# Patient Record
Sex: FEMALE | Race: WHITE | NOT HISPANIC OR LATINO | Employment: OTHER | URBAN - METROPOLITAN AREA
[De-identification: names, ages, dates, MRNs, and addresses within clinical notes are randomized per-mention and may not be internally consistent; named-entity substitution may affect disease eponyms.]

---

## 2020-12-09 ENCOUNTER — OFFICE VISIT (OUTPATIENT)
Dept: OBSTETRICS AND GYNECOLOGY | Facility: CLINIC | Age: 70
End: 2020-12-09

## 2020-12-09 VITALS
BODY MASS INDEX: 22.5 KG/M2 | WEIGHT: 140 LBS | HEIGHT: 66 IN | DIASTOLIC BLOOD PRESSURE: 70 MMHG | SYSTOLIC BLOOD PRESSURE: 122 MMHG

## 2020-12-09 DIAGNOSIS — Z01.419 ENCOUNTER FOR GYNECOLOGICAL EXAMINATION WITHOUT ABNORMAL FINDING: Primary | ICD-10-CM

## 2020-12-09 DIAGNOSIS — N39.42 INCONTINENCE WITHOUT SENSORY AWARENESS: ICD-10-CM

## 2020-12-09 DIAGNOSIS — Z12.39 ENCOUNTER FOR BREAST CANCER SCREENING USING NON-MAMMOGRAM MODALITY: ICD-10-CM

## 2020-12-09 DIAGNOSIS — N39.3 STRESS INCONTINENCE IN FEMALE: ICD-10-CM

## 2020-12-09 LAB
DEVELOPER EXPIRATION DATE: NORMAL
DEVELOPER LOT NUMBER: NORMAL
EXPIRATION DATE: NORMAL
FECAL OCCULT BLOOD SCREEN, POC: NEGATIVE
Lab: NORMAL
NEGATIVE CONTROL: NEGATIVE
POSITIVE CONTROL: POSITIVE

## 2020-12-09 PROCEDURE — G0101 CA SCREEN;PELVIC/BREAST EXAM: HCPCS | Performed by: OBSTETRICS & GYNECOLOGY

## 2020-12-09 PROCEDURE — 82274 ASSAY TEST FOR BLOOD FECAL: CPT | Performed by: OBSTETRICS & GYNECOLOGY

## 2020-12-09 RX ORDER — LEVOTHYROXINE SODIUM 0.1 MG/1
TABLET ORAL
COMMUNITY
End: 2022-07-27

## 2020-12-09 RX ORDER — LISINOPRIL 10 MG/1
TABLET ORAL
COMMUNITY

## 2020-12-09 RX ORDER — LEVOTHYROXINE SODIUM 112 UG/1
TABLET ORAL
COMMUNITY
Start: 2020-12-03

## 2020-12-09 RX ORDER — ATENOLOL 50 MG/1
TABLET ORAL
COMMUNITY

## 2020-12-09 RX ORDER — GEMFIBROZIL 600 MG/1
TABLET, FILM COATED ORAL
COMMUNITY
End: 2023-01-05

## 2020-12-09 NOTE — PROGRESS NOTES
Subjective    Chief Complaint   Patient presents with   • Gynecologic Exam     AE      History of Present Illness    Pat Willoughby is a 70 y.o. female who presents for annual exam.  Non-smoker.  Mammogram in August negative.  Getting a copy.  Previous normal DEXA scan.  Colonoscopy 2017 and due in 2 more years due to polyp history.  No bleeding and no problems.    Obstetric History:  OB History    No obstetric history on file.        Menstrual History:     No LMP recorded.       Past Medical History:   Diagnosis Date   • Disease of thyroid gland    • Heartburn    • Hyperlipidemia    • Hypertension      Family History   Problem Relation Age of Onset   • Lung cancer Father    • Heart disease Mother    • Cancer Brother    • Breast cancer Sister      Social History     Tobacco Use   Smoking Status Never Smoker         The following portions of the patient's history were reviewed and updated as appropriate: allergies, current medications, past family history, past medical history, past social history, past surgical history and problem list.    Review of Systems   Constitutional: Negative.  Negative for fever and unexpected weight change.   HENT: Negative.    Respiratory: Negative for shortness of breath and wheezing.    Cardiovascular: Negative for chest pain, palpitations and leg swelling.   Gastrointestinal: Negative for abdominal pain, anal bleeding and blood in stool.   Genitourinary: Negative for dysuria, pelvic pain, urgency, vaginal bleeding, vaginal discharge and vaginal pain.   Skin: Negative.    Neurological: Negative.    Hematological: Negative.  Negative for adenopathy.   Psychiatric/Behavioral: Negative.  Negative for dysphoric mood. The patient is not nervous/anxious.             Objective   Physical Exam  Exam conducted with a chaperone present.   Constitutional:       Appearance: Normal appearance. She is well-developed.   HENT:      Head: Normocephalic.   Neck:      Thyroid: No thyromegaly.      Trachea:  "Trachea normal. No tracheal deviation.   Cardiovascular:      Rate and Rhythm: Normal rate and regular rhythm.      Heart sounds: Normal heart sounds. No murmur.   Pulmonary:      Effort: Pulmonary effort is normal.      Breath sounds: Normal breath sounds.   Chest:      Breasts:         Right: Normal. No mass, nipple discharge or tenderness.         Left: Normal. No mass, nipple discharge or tenderness.   Abdominal:      Palpations: Abdomen is soft. There is no mass.      Tenderness: There is no abdominal tenderness.      Hernia: No hernia is present.   Genitourinary:     General: Normal vulva.      Labia:         Right: No tenderness or lesion.         Left: No tenderness or lesion.       Urethra: No prolapse or urethral lesion.      Vagina: Normal. No vaginal discharge or lesions.      Cervix: No cervical motion tenderness.      Uterus: Not enlarged and not tender.       Adnexa:         Right: No mass or tenderness.          Left: No mass or tenderness.        Rectum: Normal. Guaiac result negative. No external hemorrhoid or internal hemorrhoid. Normal anal tone.      Comments: External genitalia normal   Lymphadenopathy:      Cervical: No cervical adenopathy.      Upper Body:      Right upper body: No axillary adenopathy.      Left upper body: No axillary adenopathy.   Skin:     General: Skin is warm and dry.      Findings: No rash.   Neurological:      Mental Status: She is alert and oriented to person, place, and time.   Psychiatric:         Behavior: Behavior normal.         /70   Ht 167.6 cm (66\")   Wt 63.5 kg (140 lb)   BMI 22.60 kg/m²     Assessment/Plan   Diagnoses and all orders for this visit:    1. Encounter for gynecological examination without abnormal finding (Primary)  -     IGP,rfx Aptima HPV All Pth  -     POC Occult Blood Stool    2. Encounter for breast cancer screening using non-mammogram modality        Return to office 1 year.  Counseled about taking calcium and vitamin D.         "

## 2020-12-11 ENCOUNTER — TELEPHONE (OUTPATIENT)
Dept: OBSTETRICS AND GYNECOLOGY | Facility: CLINIC | Age: 70
End: 2020-12-11

## 2020-12-11 LAB
APPEARANCE UR: CLEAR
BACTERIA #/AREA URNS HPF: NORMAL /[HPF]
BACTERIA UR CULT: NO GROWTH
BACTERIA UR CULT: NORMAL
BILIRUB UR QL STRIP: NEGATIVE
COLOR UR: YELLOW
EPI CELLS #/AREA URNS HPF: NORMAL /HPF (ref 0–10)
GLUCOSE UR QL: NEGATIVE
HGB UR QL STRIP: NEGATIVE
KETONES UR QL STRIP: NEGATIVE
LEUKOCYTE ESTERASE UR QL STRIP: ABNORMAL
MICRO URNS: ABNORMAL
NITRITE UR QL STRIP: NEGATIVE
PH UR STRIP: 6.5 [PH] (ref 5–7.5)
PROT UR QL STRIP: NEGATIVE
RBC #/AREA URNS HPF: NORMAL /HPF (ref 0–2)
SP GR UR: 1.01 (ref 1–1.03)
UROBILINOGEN UR STRIP-MCNC: 0.2 MG/DL (ref 0.2–1)
WBC #/AREA URNS HPF: NORMAL /HPF (ref 0–5)

## 2020-12-11 NOTE — TELEPHONE ENCOUNTER
----- Message from Zander Das MD sent at 12/11/2020 12:01 PM EST -----  Please tell patient that her urinalysis and urine culture were negative for UTI.  JOSE

## 2020-12-14 LAB
CONV .: NORMAL
CYTOLOGIST CVX/VAG CYTO: NORMAL
CYTOLOGY CVX/VAG DOC CYTO: NORMAL
CYTOLOGY CVX/VAG DOC THIN PREP: NORMAL
DX ICD CODE: NORMAL
HIV 1 & 2 AB SER-IMP: NORMAL
OTHER STN SPEC: NORMAL
PATHOLOGIST CVX/VAG CYTO: NORMAL
STAT OF ADQ CVX/VAG CYTO-IMP: NORMAL

## 2021-01-04 ENCOUNTER — TELEPHONE (OUTPATIENT)
Dept: OBSTETRICS AND GYNECOLOGY | Facility: CLINIC | Age: 71
End: 2021-01-04

## 2021-01-04 NOTE — TELEPHONE ENCOUNTER
You can tell patient that her Pap is totally negative.  It does list some reactive changes in the cells but that is totally normal and nothing to need repeat or be worried about precancer.  Thankyou JOSE

## 2021-03-24 ENCOUNTER — OFFICE VISIT (OUTPATIENT)
Dept: OBSTETRICS AND GYNECOLOGY | Facility: CLINIC | Age: 71
End: 2021-03-24

## 2021-03-24 VITALS
DIASTOLIC BLOOD PRESSURE: 76 MMHG | SYSTOLIC BLOOD PRESSURE: 126 MMHG | HEIGHT: 66 IN | BODY MASS INDEX: 22.5 KG/M2 | WEIGHT: 140 LBS

## 2021-03-24 DIAGNOSIS — N81.4 UTERINE PROLAPSE: Primary | ICD-10-CM

## 2021-03-24 PROCEDURE — 99213 OFFICE O/P EST LOW 20 MIN: CPT | Performed by: OBSTETRICS & GYNECOLOGY

## 2021-03-24 NOTE — PROGRESS NOTES
"Subjective    Chief Complaint   Patient presents with   • Follow-up     pt states vaginal bump      History of Present Illness    Pat Willoughby is a 71 y.o. female who presents for slightly worsening uterine prolapse which patient has recently noticed.  She has absolutely no bleeding or other symptoms such as pelvic pain or pressure.  She does note that it is worse when she is on her feet.  She is not sexually active.  Pap smear performed in December was negative and exam at that time did not demonstrate it.  Of course it may have not been showing since she was lying down.    Obstetric History:  OB History    No obstetric history on file.        Menstrual History:     No LMP recorded.       Past Medical History:   Diagnosis Date   • Disease of thyroid gland    • Heartburn    • Hyperlipidemia    • Hypertension      Family History   Problem Relation Age of Onset   • Lung cancer Father    • Heart disease Mother    • Cancer Brother    • Breast cancer Sister        The following portions of the patient's history were reviewed and updated as appropriate: allergies, current medications, past family history, past medical history, past social history, past surgical history and problem list.    Review of Systems  Negative except for seeing and feeling cervix at the introitus       Objective   Physical Exam  Second to third-degree uterine descensus with cervix at the introitus.  No large cystocele.  Bimanual exam negative.  /76   Ht 167.6 cm (66\")   Wt 63.5 kg (140 lb)   BMI 22.60 kg/m²     Assessment/Plan   Diagnoses and all orders for this visit:    1. Uterine prolapse (Primary)        Impression and plan.  Discussed uterine descensus with patient.  Her cervix does come to the introitus and we did discuss pessary and possible hysterectomy if it worsens but she is having no symptoms now and would like me to recheck it in 4 months.  I reassured her there was nothing else going on at this time.  She does get some urinary " frequency but states she does drink beer and that is probably the reason.

## 2021-07-28 ENCOUNTER — OFFICE VISIT (OUTPATIENT)
Dept: OBSTETRICS AND GYNECOLOGY | Facility: CLINIC | Age: 71
End: 2021-07-28

## 2021-07-28 VITALS
BODY MASS INDEX: 22.5 KG/M2 | DIASTOLIC BLOOD PRESSURE: 80 MMHG | HEIGHT: 66 IN | WEIGHT: 140 LBS | SYSTOLIC BLOOD PRESSURE: 122 MMHG

## 2021-07-28 DIAGNOSIS — N81.4 UTERINE PROLAPSE: Primary | ICD-10-CM

## 2021-07-28 PROCEDURE — 99213 OFFICE O/P EST LOW 20 MIN: CPT | Performed by: OBSTETRICS & GYNECOLOGY

## 2021-07-28 NOTE — PROGRESS NOTES
"Subjective    Chief Complaint   Patient presents with   • Follow-up     f/u uterine prolapse      History of Present Illness    Pat Willoughby is a 71 y.o. female who presents for follow-up of uterine descensus.  Patient has almost no symptoms although she can occasionally see the cervix at the introitus.  She does not feel like it has gotten worse.  She is not sexually active.  She is not having any pelvic pressure or pain.    Obstetric History:  OB History    No obstetric history on file.        Menstrual History:     No LMP recorded.       Past Medical History:   Diagnosis Date   • Disease of thyroid gland    • Heartburn    • Hyperlipidemia    • Hypertension      Family History   Problem Relation Age of Onset   • Lung cancer Father    • Heart disease Mother    • Cancer Brother    • Breast cancer Sister        The following portions of the patient's history were reviewed and updated as appropriate: allergies, current medications, past family history, past medical history, past social history, past surgical history and problem list.    Review of Systems  Negative       Objective   Physical Exam  Uterine descensus today second-degree.  Bimanual exam negative.  Cervix without lesion.  /80   Ht 167.6 cm (66\")   Wt 63.5 kg (140 lb)   BMI 22.60 kg/m²     Assessment/Plan   Diagnoses and all orders for this visit:    1. Uterine prolapse (Primary)        Impression and plan.   Discussion today concerning uterine prolapse which has not appeared to have changed since 4 months ago.  Due to her lack of symptoms at this time and her desire not to have surgery unless needed I feel like we should just continue to watch it and have her return with her next annual exam.  We discussed doing surgery if she started to become symptomatic and she would be relatively low risk for this as she has had no major medical problems or pelvic surgery other than tubal ligation.  Patient knows she can return at any time if she becomes more " symptomatic.  She is not interested in a pessary at this time and I do not really feel like she needs 1..

## 2022-07-27 ENCOUNTER — OFFICE VISIT (OUTPATIENT)
Dept: OBSTETRICS AND GYNECOLOGY | Facility: CLINIC | Age: 72
End: 2022-07-27

## 2022-07-27 VITALS
WEIGHT: 143 LBS | DIASTOLIC BLOOD PRESSURE: 80 MMHG | BODY MASS INDEX: 22.98 KG/M2 | HEIGHT: 66 IN | SYSTOLIC BLOOD PRESSURE: 140 MMHG

## 2022-07-27 DIAGNOSIS — N81.4 UTERINE PROLAPSE: Primary | ICD-10-CM

## 2022-07-27 PROCEDURE — 99213 OFFICE O/P EST LOW 20 MIN: CPT | Performed by: OBSTETRICS & GYNECOLOGY

## 2022-07-27 RX ORDER — PANTOPRAZOLE SODIUM 40 MG/1
TABLET, DELAYED RELEASE ORAL
COMMUNITY
Start: 2022-06-10

## 2022-07-27 NOTE — PROGRESS NOTES
"Subjective    Chief Complaint   Patient presents with   • Follow-up     Uterine prolapse, pt states feels a little worse      History of Present Illness    Pat Willoughby is a 72 y.o. female who presents for follow-up of second-degree uterine descensus which patient states may be a little worse, especially with standing.  She states she has no stress incontinence issues.  She really is having no significant pelvic pain.  She is just concerned because it may have progressed a little bit.    Obstetric History:  OB History    No obstetric history on file.        Menstrual History:     No LMP recorded.       Past Medical History:   Diagnosis Date   • Disease of thyroid gland    • Heartburn    • Hyperlipidemia    • Hypertension      Family History   Problem Relation Age of Onset   • Lung cancer Father    • Heart disease Mother    • Cancer Brother    • Breast cancer Sister        The following portions of the patient's history were reviewed and updated as appropriate: allergies, current medications, past family history, past medical history, past social history, past surgical history and problem list.    Review of Systems  As per HPI       Objective   Physical Exam  Vagina clear.  No significant cystocele.  Second to third-degree uterine descensus with cervix at introitus.  Bimanual and rectovaginal exam negative.  No significant rectocele.  /80   Ht 167.6 cm (66\")   Wt 64.9 kg (143 lb)   BMI 23.08 kg/m²     Assessment & Plan   Diagnoses and all orders for this visit:    1. Uterine prolapse (Primary)  -     US Non-ob Transvaginal; Future        Impression and plan.  Discussed options concerning uterine descensus without incontinence including total vaginal hysterectomy, pessary placement, or continued observation.  I explained that I usually do like to check the ovaries with an ultrasound prior to a hysterectomy as any abnormality there could influence what surgery we do.  Patient would like to have an ultrasound " although she has not made any decision about surgery.  She will come back in the next couple weeks for an ultrasound and to follow-up with me.  She is not due for Pap smear until December.

## 2022-08-11 ENCOUNTER — OFFICE VISIT (OUTPATIENT)
Dept: OBSTETRICS AND GYNECOLOGY | Facility: CLINIC | Age: 72
End: 2022-08-11

## 2022-08-11 VITALS
DIASTOLIC BLOOD PRESSURE: 84 MMHG | BODY MASS INDEX: 22.98 KG/M2 | HEIGHT: 66 IN | WEIGHT: 143 LBS | SYSTOLIC BLOOD PRESSURE: 142 MMHG

## 2022-08-11 DIAGNOSIS — N83.201 CYST OF RIGHT OVARY: ICD-10-CM

## 2022-08-11 DIAGNOSIS — N81.4 UTERINE PROLAPSE: Primary | ICD-10-CM

## 2022-08-11 PROCEDURE — 99213 OFFICE O/P EST LOW 20 MIN: CPT | Performed by: OBSTETRICS & GYNECOLOGY

## 2022-08-11 NOTE — PROGRESS NOTES
"Subjective    Chief Complaint   Patient presents with   • Follow-up     Discuss u/s      History of Present Illness    Pat Willoughby is a 72 y.o. female who presents for problems with uterine prolapse without incontinence, here today for pelvic ultrasound.  Ultrasound today showed normal 2 mm endometrial stripe with small fibroid and 1.3 cm complex right ovarian cyst.  Patient not having any symptoms from her prolapse and really not wanting surgery at this time.  No vaginal bleeding    Obstetric History:  OB History    No obstetric history on file.        Menstrual History:     No LMP recorded.       Past Medical History:   Diagnosis Date   • Disease of thyroid gland    • Heartburn    • Hyperlipidemia    • Hypertension      Family History   Problem Relation Age of Onset   • Lung cancer Father    • Heart disease Mother    • Cancer Brother    • Breast cancer Sister        The following portions of the patient's history were reviewed and updated as appropriate: allergies, current medications, past family history, past medical history, past social history, past surgical history and problem list.    Review of Systems  As per HPI       Objective   Physical Exam  None today  /84   Ht 167.6 cm (66\")   Wt 64.9 kg (143 lb)   BMI 23.08 kg/m²     Assessment & Plan   Diagnoses and all orders for this visit:    1. Uterine prolapse (Primary)    2. Cyst of right ovary        Impression and plan.  Patient has annual exam due in December so we will schedule that with a pelvic ultrasound to recheck her small right complex ovarian cyst.  We can recheck her uterine prolapse at that time also.  She has a mammogram scheduled this month.  Patient is happy with this plan.               "

## 2022-12-22 ENCOUNTER — TELEPHONE (OUTPATIENT)
Dept: OBSTETRICS AND GYNECOLOGY | Facility: CLINIC | Age: 72
End: 2022-12-22

## 2022-12-22 NOTE — TELEPHONE ENCOUNTER
Patient is calling regarding appt and ultrasound scheduled 1/5/2023. Pt wanted to check and see if there was anything sooner available, I let her know I did not see anything. She would like to let provider know that ovarian cyst seems to have gotten bigger. Pt says there is no pain, just discomfort. She is okay with waiting with 1/5, and would like to confirm with provider if that is okay.     Please advise,  Thank you

## 2023-01-05 ENCOUNTER — OFFICE VISIT (OUTPATIENT)
Dept: OBSTETRICS AND GYNECOLOGY | Facility: CLINIC | Age: 73
End: 2023-01-05
Payer: MEDICARE

## 2023-01-05 VITALS
WEIGHT: 141 LBS | HEIGHT: 66 IN | DIASTOLIC BLOOD PRESSURE: 83 MMHG | SYSTOLIC BLOOD PRESSURE: 150 MMHG | BODY MASS INDEX: 22.66 KG/M2

## 2023-01-05 DIAGNOSIS — N81.4 UTERINE PROLAPSE: ICD-10-CM

## 2023-01-05 DIAGNOSIS — D39.11 NEOPLASM OF UNCERTAIN BEHAVIOR OF RIGHT OVARY: ICD-10-CM

## 2023-01-05 DIAGNOSIS — Z78.0 ASYMPTOMATIC MENOPAUSAL STATE: ICD-10-CM

## 2023-01-05 DIAGNOSIS — Z01.411 ENCOUNTER FOR GYNECOLOGICAL EXAMINATION WITH ABNORMAL FINDING: Primary | ICD-10-CM

## 2023-01-05 DIAGNOSIS — N83.201 RIGHT OVARIAN CYST: ICD-10-CM

## 2023-01-05 DIAGNOSIS — N95.1 MENOPAUSAL STATE: ICD-10-CM

## 2023-01-05 PROCEDURE — 3015F CERV CANCER SCREEN DOCD: CPT | Performed by: OBSTETRICS & GYNECOLOGY

## 2023-01-05 PROCEDURE — G0101 CA SCREEN;PELVIC/BREAST EXAM: HCPCS | Performed by: OBSTETRICS & GYNECOLOGY

## 2023-01-05 RX ORDER — ATORVASTATIN CALCIUM 10 MG/1
TABLET, FILM COATED ORAL
COMMUNITY
Start: 2022-12-01

## 2023-01-05 NOTE — PROGRESS NOTES
Subjective    Chief Complaint   Patient presents with   • Gynecologic Exam     AE, Discuss u/s      History of Present Illness    Pat Willoughby is a 73 y.o. female who presents for annual exam.  Non-smoker.  Mammogram due in August.  Colonoscopy 2021 showed benign polyp.  Patient has had some uterine descensus which has been only mildly symptomatic and may be a little worse as it does come to the introitus.  Patient has not wanted surgery or pessary.  She has not had a DEXA scan in years.  She has no bleeding or other issues.  She has been followed with a tiny right complex ovarian cyst only 1.2 cm in size which is unchanged from 5 months ago.  Ultrasound today also shows only a 4 mm endometrial stripe and a normal left ovary.    Obstetric History:  OB History    No obstetric history on file.        Menstrual History:     No LMP recorded.       Past Medical History:   Diagnosis Date   • Disease of thyroid gland    • Heartburn    • Hyperlipidemia    • Hypertension      Family History   Problem Relation Age of Onset   • Lung cancer Father    • Heart disease Mother    • Cancer Brother    • Breast cancer Sister      Social History     Tobacco Use   Smoking Status Never   Smokeless Tobacco Not on file         The following portions of the patient's history were reviewed and updated as appropriate: allergies, current medications, past family history, past medical history, past social history, past surgical history and problem list.    Review of Systems   Constitutional: Negative.  Negative for fever and unexpected weight change.   HENT: Negative.    Respiratory: Negative for shortness of breath and wheezing.    Cardiovascular: Negative for chest pain, palpitations and leg swelling.   Gastrointestinal: Negative for abdominal pain, anal bleeding and blood in stool.   Genitourinary: Negative for dysuria, pelvic pain, urgency, vaginal bleeding, vaginal discharge and vaginal pain.   Skin: Negative.    Neurological: Negative.     Hematological: Negative.  Negative for adenopathy.   Psychiatric/Behavioral: Negative.  Negative for dysphoric mood. The patient is not nervous/anxious.             Objective   Physical Exam  Exam conducted with a chaperone present.   Constitutional:       Appearance: Normal appearance. She is well-developed.   HENT:      Head: Normocephalic.   Neck:      Thyroid: No thyromegaly.      Trachea: Trachea normal. No tracheal deviation.   Cardiovascular:      Rate and Rhythm: Normal rate and regular rhythm.      Heart sounds: Normal heart sounds. No murmur heard.  Pulmonary:      Effort: Pulmonary effort is normal.      Breath sounds: Normal breath sounds.   Chest:   Breasts:     Right: Normal. No mass, nipple discharge or tenderness.      Left: Normal. No mass, nipple discharge or tenderness.   Abdominal:      Palpations: Abdomen is soft. There is no mass.      Tenderness: There is no abdominal tenderness.      Hernia: No hernia is present.   Genitourinary:     General: Normal vulva.      Labia:         Right: No tenderness or lesion.         Left: No tenderness or lesion.       Urethra: No prolapse or urethral lesion.      Vagina: Normal. No vaginal discharge or lesions.      Cervix: No cervical motion tenderness.      Uterus: With uterine prolapse. Not enlarged and not tender.       Adnexa:         Right: No mass or tenderness.          Left: No mass or tenderness.        Rectum: Normal. No external hemorrhoid or internal hemorrhoid. Normal anal tone.      Comments: External genitalia normal .  Third-degree uterine prolapse with no obvious rectocele or cystocele.  No stress incontinence.  Minimal symptoms.  Lymphadenopathy:      Cervical: No cervical adenopathy.      Upper Body:      Right upper body: No axillary adenopathy.      Left upper body: No axillary adenopathy.   Skin:     General: Skin is warm and dry.      Findings: No rash.   Neurological:      Mental Status: She is alert and oriented to person, place,  and time.   Psychiatric:         Behavior: Behavior normal.         /83   Ht 167.6 cm (66\")   Wt 64 kg (141 lb)   BMI 22.76 kg/m²     Assessment & Plan   Diagnoses and all orders for this visit:    1. Encounter for gynecological examination with abnormal finding (Primary)  -     IGP,rfx Aptima HPV All Pth    2. Uterine prolapse    3. Menopausal state  -     DEXA Bone Density Axial; Future    4. Right ovarian cyst  -       -     US Non-ob Transvaginal; Future    5. 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  Patient explained that she has 3 choices for uterine prolapse including observation, vaginal hysterectomy, or even vaginal pessary, but since she is having very few symptoms only wants to watch it at this time.  She also understands that her ovary has not changed concerning the cyst and does not want surgery or any other evaluation except for repeat ultrasound and will check Ca1 25.  Counseled about getting a DEXA scan for osteoporosis prevention and starting calcium with vitamin D.

## 2023-01-06 LAB — CANCER AG125 SERPL-ACNC: 5.8 U/ML (ref 0–38.1)

## 2023-01-08 LAB
CONV .: NORMAL
CYTOLOGIST CVX/VAG CYTO: NORMAL
CYTOLOGY CVX/VAG DOC CYTO: NORMAL
CYTOLOGY CVX/VAG DOC THIN PREP: NORMAL
DX ICD CODE: NORMAL
HIV 1 & 2 AB SER-IMP: NORMAL
OTHER STN SPEC: NORMAL
STAT OF ADQ CVX/VAG CYTO-IMP: NORMAL

## 2023-01-09 ENCOUNTER — TELEPHONE (OUTPATIENT)
Dept: OBSTETRICS AND GYNECOLOGY | Facility: CLINIC | Age: 73
End: 2023-01-09
Payer: MEDICARE

## 2023-01-09 NOTE — TELEPHONE ENCOUNTER
----- Message from Zander Das MD sent at 1/9/2023 12:56 PM EST -----  Please tell patient her  was normal.  Thank you

## 2023-08-28 ENCOUNTER — OFFICE VISIT (OUTPATIENT)
Dept: OBSTETRICS AND GYNECOLOGY | Facility: CLINIC | Age: 73
End: 2023-08-28
Payer: MEDICARE

## 2023-08-28 ENCOUNTER — PROCEDURE VISIT (OUTPATIENT)
Dept: OBSTETRICS AND GYNECOLOGY | Facility: CLINIC | Age: 73
End: 2023-08-28
Payer: MEDICARE

## 2023-08-28 VITALS
HEIGHT: 66 IN | BODY MASS INDEX: 22.18 KG/M2 | SYSTOLIC BLOOD PRESSURE: 149 MMHG | WEIGHT: 138 LBS | DIASTOLIC BLOOD PRESSURE: 82 MMHG

## 2023-08-28 DIAGNOSIS — Z12.31 VISIT FOR SCREENING MAMMOGRAM: Primary | ICD-10-CM

## 2023-08-28 DIAGNOSIS — N81.4 UTERINE PROLAPSE: Primary | ICD-10-CM

## 2023-08-28 NOTE — PROGRESS NOTES
"Subjective    Chief Complaint   Patient presents with    Follow-up     Pt here today to follow up prolapse, states has gotten worse. U/S and Mammogram today       History of Present Illness    Pat Willoughby is a 73 y.o. female who presents for follow-up of uterine prolapse.  Ultrasound today shows only a simple right ovarian follicle which has decreased in size from January 2023 ultrasound when it was a complex cyst.  Patient states her prolapse has gotten worse as she can see her cervix at her introitus.  Nevertheless she absolutely does not want surgery unless absolutely necessary.  She is not interested in a pessary at this time either.    Obstetric History:  OB History    No obstetric history on file.        Menstrual History:     No LMP recorded.       Past Medical History:   Diagnosis Date    Disease of thyroid gland     Heartburn     Hyperlipidemia     Hypertension      Family History   Problem Relation Age of Onset    Lung cancer Father     Heart disease Mother     Cancer Brother     Breast cancer Sister        The following portions of the patient's history were reviewed and updated as appropriate: allergies, current medications, past medical history, past surgical history, and problem list.    Review of Systems  No stress incontinence, vaginal bleeding, etc.       Objective   Physical Exam  Uterine prolapse to the introitus but not through it today.  Bimanual exam negative.  No cystocele or rectocele noted.  /82   Ht 167.6 cm (66\")   Wt 62.6 kg (138 lb)   BMI 22.27 kg/mý     Assessment & Plan   Diagnoses and all orders for this visit:    1. Uterine prolapse (Primary)        Impression and plan.  Discussed options in detail including placement of a pessary or total vaginal hysterectomy.  She has had no previous pelvic surgery.  She wants to continue observation at this time but will come in in 3 months for recheck.  Do not feel she needs any subsequent pelvic ultrasounds as she does not have a " significant ovarian cyst at this time.

## 2024-10-30 ENCOUNTER — OFFICE VISIT (OUTPATIENT)
Dept: OBSTETRICS AND GYNECOLOGY | Facility: CLINIC | Age: 74
End: 2024-10-30
Payer: MEDICARE

## 2024-10-30 VITALS
WEIGHT: 144.6 LBS | DIASTOLIC BLOOD PRESSURE: 105 MMHG | BODY MASS INDEX: 23.34 KG/M2 | SYSTOLIC BLOOD PRESSURE: 195 MMHG | HEART RATE: 73 BPM

## 2024-10-30 DIAGNOSIS — N81.4 UTERINE PROLAPSE: Primary | ICD-10-CM

## 2024-10-30 NOTE — PROGRESS NOTES
"Chief Complaint  Vaginal Prolapse- pt here to follow up on vaginal prolapse. Pt states she feels it fall a little bit more when she's walking.     Subjective        Pat Willoughby presents to [facility]  History of Present Illness  Patient is a 74 year old female with known history of uterine prolapse.  She has declined management in the past but does feel it has worsened over the last year.  She denies vaginal bleeding.  She states she can often feel her cervix when wiping.  She denies any urinary symptoms.  Objective   Vital Signs:  BP (!) 195/105   Pulse 73   Wt 65.6 kg (144 lb 9.6 oz)   BMI 23.34 kg/m²   Estimated body mass index is 23.34 kg/m² as calculated from the following:    Height as of 8/28/23: 167.6 cm (66\").    Weight as of this encounter: 65.6 kg (144 lb 9.6 oz).    BMI is within normal parameters. No other follow-up for BMI required.      Physical Exam  Vitals reviewed. Exam conducted with a chaperone present.   Constitutional:       Appearance: She is well-developed.   Genitourinary:     Labia:         Right: No rash, tenderness, lesion or injury.         Left: No rash, tenderness, lesion or injury.       Vagina: Normal. Prolapsed vaginal walls (Grade 2 rectocele) present.      Cervix: Normal.      Uterus: With uterine prolapse (Grade 3 uterine prolapse).    Neurological:      Mental Status: She is alert.   Psychiatric:         Behavior: Behavior normal.        Result Review :                Assessment and Plan   Diagnoses and all orders for this visit:    1. Uterine prolapse (Primary)  -     Ambulatory Referral to Gynecologic Urology    Patient has noted to have uterine prolapse to the introitus.  She is also noted to have a rectocele on exam.  I again discussed management options including pelvic floor therapy, pessary, or surgical management.  I discussed recommendations for referral to urogynecology if she wishes to proceed with surgical management.  She would like the " referral and referral was placed.         Follow Up   No follow-ups on file.  Patient was given instructions and counseling regarding her condition or for health maintenance advice. Please see specific information pulled into the AVS if appropriate.